# Patient Record
Sex: FEMALE | Race: WHITE | NOT HISPANIC OR LATINO | Employment: OTHER
[De-identification: names, ages, dates, MRNs, and addresses within clinical notes are randomized per-mention and may not be internally consistent; named-entity substitution may affect disease eponyms.]

---

## 2017-10-30 ENCOUNTER — ESTABLISHED COMPREHENSIVE EXAM (OUTPATIENT)
Age: 72
End: 2017-10-30

## 2017-10-30 DIAGNOSIS — H04.202: ICD-10-CM

## 2017-10-30 DIAGNOSIS — H40.023: ICD-10-CM

## 2017-10-30 DIAGNOSIS — H26.493: ICD-10-CM

## 2017-10-30 PROCEDURE — 92014 COMPRE OPH EXAM EST PT 1/>: CPT

## 2017-10-30 PROCEDURE — 1036F TOBACCO NON-USER: CPT

## 2017-10-30 PROCEDURE — 92133 CPTRZD OPH DX IMG PST SGM ON: CPT

## 2017-10-30 PROCEDURE — G8427 DOCREV CUR MEDS BY ELIG CLIN: HCPCS

## 2017-10-30 PROCEDURE — G8756 NO BP MEASURE DOC: HCPCS

## 2017-10-30 PROCEDURE — 92015 DETERMINE REFRACTIVE STATE: CPT

## 2017-10-30 ASSESSMENT — TONOMETRY
OD_IOP_MMHG: 12
OS_IOP_MMHG: 13

## 2017-10-30 ASSESSMENT — VISUAL ACUITY
OS_CC: J5
OD_CC: J1
OD_CC: 20/25-2
OS_CC: 20/30-2

## 2017-11-27 ENCOUNTER — VISUAL FIELD (OUTPATIENT)
Age: 72
End: 2017-11-27

## 2017-11-27 DIAGNOSIS — H40.023: ICD-10-CM

## 2017-11-27 PROCEDURE — 0517F GLAUCOMA PLAN OF CARE DOCD: CPT

## 2017-11-27 PROCEDURE — 2027F OPTIC NERVE HEAD EVAL DONE: CPT

## 2017-11-27 PROCEDURE — 92083 EXTENDED VISUAL FIELD XM: CPT

## 2017-11-27 PROCEDURE — 3285F IOP DOWN <15% OF PRE-SVC LVL: CPT

## 2017-11-27 PROCEDURE — 1036F TOBACCO NON-USER: CPT

## 2017-11-27 PROCEDURE — G8427 DOCREV CUR MEDS BY ELIG CLIN: HCPCS

## 2018-02-06 ENCOUNTER — IOP CHECK (OUTPATIENT)
Age: 73
End: 2018-02-06

## 2018-02-06 DIAGNOSIS — H40.023: ICD-10-CM

## 2018-02-06 PROCEDURE — 4040F PNEUMOC VAC/ADMIN/RCVD: CPT

## 2018-02-06 PROCEDURE — 1036F TOBACCO NON-USER: CPT

## 2018-02-06 PROCEDURE — G8428 CUR MEDS NOT DOCUMENT: HCPCS

## 2018-02-06 PROCEDURE — G8482 FLU IMMUNIZE ORDER/ADMIN: HCPCS

## 2018-02-06 PROCEDURE — 99212 OFFICE O/P EST SF 10 MIN: CPT

## 2018-02-06 ASSESSMENT — VISUAL ACUITY
OS_CC: 20/30-1
OD_CC: 20/30-1

## 2018-02-06 ASSESSMENT — TONOMETRY
OS_IOP_MMHG: 14
OD_IOP_MMHG: 18

## 2018-04-24 ENCOUNTER — VISUAL FIELD (OUTPATIENT)
Age: 73
End: 2018-04-24

## 2018-04-24 DIAGNOSIS — H04.202: ICD-10-CM

## 2018-04-24 DIAGNOSIS — H40.023: ICD-10-CM

## 2018-04-24 DIAGNOSIS — H26.493: ICD-10-CM

## 2018-04-24 PROCEDURE — 99212 OFFICE O/P EST SF 10 MIN: CPT

## 2018-04-24 PROCEDURE — G8427 DOCREV CUR MEDS BY ELIG CLIN: HCPCS

## 2018-04-24 PROCEDURE — 92083 EXTENDED VISUAL FIELD XM: CPT

## 2018-04-24 PROCEDURE — 1036F TOBACCO NON-USER: CPT

## 2018-04-24 ASSESSMENT — VISUAL ACUITY
OD_CC: 20/25-1
OS_CC: 20/30-2

## 2018-04-24 ASSESSMENT — TONOMETRY
OD_IOP_MMHG: 15
OS_IOP_MMHG: 14

## 2022-10-04 NOTE — PATIENT DISCUSSION
Patient made aware of 24/7 emergency services. The patient has been examined and the H&P has been reviewed:        Anesthesia/Surgery risks, benefits and alternative options discussed and understood by patient/family.          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